# Patient Record
Sex: FEMALE | Race: WHITE | NOT HISPANIC OR LATINO | URBAN - METROPOLITAN AREA
[De-identification: names, ages, dates, MRNs, and addresses within clinical notes are randomized per-mention and may not be internally consistent; named-entity substitution may affect disease eponyms.]

---

## 2017-08-03 ENCOUNTER — IMPORTED ENCOUNTER (OUTPATIENT)
Dept: URBAN - METROPOLITAN AREA CLINIC 50 | Facility: CLINIC | Age: 82
End: 2017-08-03

## 2017-08-04 ENCOUNTER — IMPORTED ENCOUNTER (OUTPATIENT)
Dept: URBAN - METROPOLITAN AREA CLINIC 50 | Facility: CLINIC | Age: 82
End: 2017-08-04

## 2018-04-27 ENCOUNTER — IMPORTED ENCOUNTER (OUTPATIENT)
Dept: URBAN - METROPOLITAN AREA CLINIC 50 | Facility: CLINIC | Age: 83
End: 2018-04-27

## 2018-10-26 ENCOUNTER — IMPORTED ENCOUNTER (OUTPATIENT)
Dept: URBAN - METROPOLITAN AREA CLINIC 50 | Facility: CLINIC | Age: 83
End: 2018-10-26

## 2019-04-26 ENCOUNTER — IMPORTED ENCOUNTER (OUTPATIENT)
Dept: URBAN - METROPOLITAN AREA CLINIC 50 | Facility: CLINIC | Age: 84
End: 2019-04-26

## 2019-04-26 NOTE — PATIENT DISCUSSION
"""Continue Artificial tears both eyes two - four times a day ."" ""Continue ICAPS by mouth once a day . """

## 2019-08-28 NOTE — PATIENT DISCUSSION
*CATARACT, OS, SLOWLY BECOMING VISUALLY SIGNIFICANT BUT NOT BOTHERSOME TO PATIENT AT THIS TIME. SPEC RX OFFERED. FOLLOW AS SCHEDULED.

## 2019-08-28 NOTE — PATIENT DISCUSSION
*Cataract Counseling: The patient's cataract is are becoming visually significant. Patient desires not to proceed with surgery at this time. I have discussed continuing with current spectacles versus updating. I have offered the patient a new spectacle prescription to fill if desires. Return to follow up as scheduled or sooner if symptoms arise.

## 2019-08-28 NOTE — PATIENT DISCUSSION
GLAUCOMA:  Pt has an IOP of 11 in both eyes on latanoprost one drop ou hs. Pt has 0.9 cupping ou with a normal visual field OS and a marked superior arcuate scotoma OD. Continue with latanoprost one drop ou hs.

## 2019-10-25 ENCOUNTER — IMPORTED ENCOUNTER (OUTPATIENT)
Dept: URBAN - METROPOLITAN AREA CLINIC 50 | Facility: CLINIC | Age: 84
End: 2019-10-25

## 2019-10-25 NOTE — PATIENT DISCUSSION
"""Follow dry ARMD without treatment. MVI/AREDS/Quinten. Patient to call if vision changes or distortion increases.  Good diet/do not smoke."" ""OCT Macula: OD: Stable

## 2020-05-29 ENCOUNTER — IMPORTED ENCOUNTER (OUTPATIENT)
Dept: URBAN - METROPOLITAN AREA CLINIC 50 | Facility: CLINIC | Age: 85
End: 2020-05-29

## 2020-08-19 NOTE — PATIENT DISCUSSION
Continue: prednisol ace-gatiflox-bromfen (prednisol ace-gatiflox-bromfen): drops,suspension: 1-0.5-0.075% 1 drop three times a day into affected eye 08-

## 2021-02-05 ENCOUNTER — IMPORTED ENCOUNTER (OUTPATIENT)
Dept: URBAN - METROPOLITAN AREA CLINIC 50 | Facility: CLINIC | Age: 86
End: 2021-02-05

## 2021-02-05 NOTE — PATIENT DISCUSSION
"""Follow dry ARMD without treatment. MVI/AREDS/Amsler. Patient to call if vision changes or distortion increases. Good diet/do not smoke."" ""Continue ICAPS by mouth once a day . """

## 2021-04-17 ASSESSMENT — TONOMETRY
OS_IOP_MMHG: 12
OS_IOP_MMHG: 14
OD_IOP_MMHG: 12
OS_IOP_MMHG: 12
OD_IOP_MMHG: 12
OS_IOP_MMHG: 13
OD_IOP_MMHG: 12
OD_IOP_MMHG: 12
OS_IOP_MMHG: 12
OS_IOP_MMHG: 12
OD_IOP_MMHG: 12
OS_IOP_MMHG: 12
OD_IOP_MMHG: 12
OD_IOP_MMHG: 14

## 2021-04-17 ASSESSMENT — VISUAL ACUITY
OS_SC: 20/25-1
OD_OTHER: 20/20. 20/40.
OS_BAT: 20/60
OS_BAT: 20/30
OS_OTHER: 20/50. 20/100.
OD_CC: J1+
OS_SC: 20/20
OS_SC: 20/20
OS_OTHER: 20/20. 20/30.
OD_BAT: 20/20
OD_SC: 20/25
OS_CC: J1+
OS_OTHER: 20/30. 20/50.
OS_BAT: 20/20
OD_OTHER: 20/20. 20/50.
OS_BAT: 20/50
OS_OTHER: 20/60.
OS_SC: 20/25
OD_BAT: 20/70
OD_OTHER: 20/70.
OD_SC: 20/20
OS_OTHER: 20/60. 20/70.
OS_BAT: 20/60
OD_OTHER: 20/30. 20/50.
OD_OTHER: 20/30. >20/400.
OD_OTHER: 20/60. 20/400.
OD_BAT: 20/30
OS_SC: 20/20
OD_CC: J1+
OS_CC: J1+
OD_OTHER: 20/40. 20/100.
OS_BAT: 20/30
OS_CC: 20/20
OD_BAT: 20/40
OD_SC: 20/20
OD_PH: @ 16 IN
OD_SC: 20/20
OS_PH: @ 16 IN
OD_CC: J1@ 16 IN
OS_PH: 20/25
OS_CC: J1+
OS_CC: J1+
OD_SC: 20/25
OD_BAT: 20/60
OS_BAT: 20/50
OS_OTHER: 20/50. 20/200.
OD_CC: J1+
OD_SC: 20/20
OD_SC: 20/25+2
OD_BAT: 20/30
OS_OTHER: 20/30. >20/400.
OS_SC: 20/30
OD_CC: J1+
OD_CC: 20/20
OS_SC: 20/25
OD_BAT: 20/20
OS_CC: J1@ 16 IN

## 2021-08-09 ENCOUNTER — PREPPED CHART (OUTPATIENT)
Dept: URBAN - METROPOLITAN AREA CLINIC 50 | Facility: CLINIC | Age: 86
End: 2021-08-09

## 2021-08-09 NOTE — PATIENT DISCUSSION
"""Follow dry ARMD without treatment. MVI/AREDS/Amsler. Patient to call if vision changes or distortion increases. Good diet/do not smoke."" ""Continue ICAPS by mouth once a day . ""."

## 2021-08-13 ENCOUNTER — 6 MONTH COMPREHENSIVE EXAM (OUTPATIENT)
Dept: URBAN - METROPOLITAN AREA CLINIC 50 | Facility: CLINIC | Age: 86
End: 2021-08-13

## 2021-08-13 DIAGNOSIS — H35.373: ICD-10-CM

## 2021-08-13 DIAGNOSIS — H35.3131: ICD-10-CM

## 2021-08-13 DIAGNOSIS — H43.813: ICD-10-CM

## 2021-08-13 DIAGNOSIS — H04.123: ICD-10-CM

## 2021-08-13 PROCEDURE — 92014 COMPRE OPH EXAM EST PT 1/>: CPT

## 2021-08-13 PROCEDURE — 92134 CPTRZ OPH DX IMG PST SGM RTA: CPT

## 2021-08-13 ASSESSMENT — TONOMETRY
OD_IOP_MMHG: 12
OS_IOP_MMHG: 12

## 2021-08-13 ASSESSMENT — VISUAL ACUITY
OD_GLARE: 20/30
OD_SC: 20/20
OD_GLARE: 20/20
OU_SC: 20/20
OS_GLARE: 20/30
OS_SC: 20/20
OS_GLARE: 20/20

## 2022-09-09 ENCOUNTER — COMPREHENSIVE EXAM (OUTPATIENT)
Dept: URBAN - METROPOLITAN AREA CLINIC 50 | Facility: CLINIC | Age: 87
End: 2022-09-09

## 2022-09-09 DIAGNOSIS — H43.813: ICD-10-CM

## 2022-09-09 DIAGNOSIS — H04.123: ICD-10-CM

## 2022-09-09 DIAGNOSIS — H35.373: ICD-10-CM

## 2022-09-09 DIAGNOSIS — H26.493: ICD-10-CM

## 2022-09-09 DIAGNOSIS — H35.3131: ICD-10-CM

## 2022-09-09 PROCEDURE — 92014 COMPRE OPH EXAM EST PT 1/>: CPT

## 2022-09-09 PROCEDURE — 92134 CPTRZ OPH DX IMG PST SGM RTA: CPT

## 2022-09-09 ASSESSMENT — VISUAL ACUITY
OU_CC: J1+
OD_SC: 20/25+1
OS_GLARE: 20/30
OD_GLARE: 20/30
OS_SC: 20/25-1

## 2022-09-09 ASSESSMENT — TONOMETRY
OD_IOP_MMHG: 11
OS_IOP_MMHG: 11

## 2022-12-01 NOTE — PATIENT DISCUSSION
Continue current management. Patient has been discharged by this writer. Patient verbalized understanding of discharge instructions which included following up with PCP and medication use. Patient was stable and ambulatory.

## 2024-04-05 ENCOUNTER — COMPREHENSIVE EXAM (OUTPATIENT)
Dept: URBAN - METROPOLITAN AREA CLINIC 50 | Facility: LOCATION | Age: 89
End: 2024-04-05

## 2024-04-05 DIAGNOSIS — H35.3131: ICD-10-CM

## 2024-04-05 DIAGNOSIS — H43.813: ICD-10-CM

## 2024-04-05 DIAGNOSIS — H52.4: ICD-10-CM

## 2024-04-05 DIAGNOSIS — H02.831: ICD-10-CM

## 2024-04-05 DIAGNOSIS — H35.373: ICD-10-CM

## 2024-04-05 DIAGNOSIS — H26.493: ICD-10-CM

## 2024-04-05 DIAGNOSIS — H02.834: ICD-10-CM

## 2024-04-05 DIAGNOSIS — H04.123: ICD-10-CM

## 2024-04-05 DIAGNOSIS — H35.033: ICD-10-CM

## 2024-04-05 PROCEDURE — 92134 CPTRZ OPH DX IMG PST SGM RTA: CPT

## 2024-04-05 PROCEDURE — 92015 DETERMINE REFRACTIVE STATE: CPT

## 2024-04-05 PROCEDURE — 99214 OFFICE O/P EST MOD 30 MIN: CPT

## 2024-04-05 ASSESSMENT — VISUAL ACUITY
OS_GLARE: 20/30
OD_GLARE: 20/25
OD_GLARE: 20/20
OD_SC: 20/25
OU_SC: 20/20-1
OS_SC: 20/25-2
OS_GLARE: 20/40
OU_SC: J3@16"

## 2024-04-05 ASSESSMENT — TONOMETRY
OD_IOP_MMHG: 15
OS_IOP_MMHG: 15

## 2024-04-05 ASSESSMENT — KERATOMETRY
OS_K2POWER_DIOPTERS: 42.75
OS_AXISANGLE2_DEGREES: 152
OS_AXISANGLE_DEGREES: 062
OS_K1POWER_DIOPTERS: 42.25
OD_K1POWER_DIOPTERS: 43.00
OD_AXISANGLE_DEGREES: 180
OD_K2POWER_DIOPTERS: 43.00
OD_AXISANGLE2_DEGREES: 90

## 2025-04-18 ENCOUNTER — COMPREHENSIVE EXAM (OUTPATIENT)
Age: 89
End: 2025-04-18

## 2025-04-18 DIAGNOSIS — H35.3131: ICD-10-CM

## 2025-04-18 DIAGNOSIS — H26.493: ICD-10-CM

## 2025-04-18 DIAGNOSIS — H35.09: ICD-10-CM

## 2025-04-18 DIAGNOSIS — H40.013: ICD-10-CM

## 2025-04-18 DIAGNOSIS — H35.033: ICD-10-CM

## 2025-04-18 PROCEDURE — 92134 CPTRZ OPH DX IMG PST SGM RTA: CPT

## 2025-04-18 PROCEDURE — 99215 OFFICE O/P EST HI 40 MIN: CPT
